# Patient Record
Sex: FEMALE | URBAN - METROPOLITAN AREA
[De-identification: names, ages, dates, MRNs, and addresses within clinical notes are randomized per-mention and may not be internally consistent; named-entity substitution may affect disease eponyms.]

---

## 2021-06-17 ENCOUNTER — APPOINTMENT (RX ONLY)
Dept: URBAN - METROPOLITAN AREA CLINIC 55 | Facility: CLINIC | Age: 71
Setting detail: DERMATOLOGY
End: 2021-06-17

## 2021-06-17 DIAGNOSIS — Z41.9 ENCOUNTER FOR PROCEDURE FOR PURPOSES OTHER THAN REMEDYING HEALTH STATE, UNSPECIFIED: ICD-10-CM

## 2021-06-17 PROCEDURE — ? DYSPORT

## 2021-06-17 PROCEDURE — ? FILLERS

## 2021-06-17 NOTE — PROCEDURE: FILLERS
[place] Filler Volume In Cc: 0
Expiration Date (Month Year): 12/2023
Anesthesia Type: 1% lidocaine with epinephrine
Include Cannula Size?: 27G
Include Cannula Information In Note?: No
Map Statment: See Attach Map for Complete Details
Filler: Restylane Kysse
Expiration Date (Month Year): 10/2022
Filler: Melia Miller
Lot #: [id number]
Detail Level: Simple
Lot #: [id number]
Include Cannula Information In Note?: Yes
Post-Care Instructions: After care instructions were provided to the patient.
Anesthesia Volume In Cc: 0.5
Consent: Written consent was obtained.

## 2021-06-17 NOTE — PROCEDURE: DYSPORT
Left Periorbital Skin Units: 0
Glabellar Complex Units: 65
Lot #: [id number]
Show Additional Area 6: Yes
Show Mentalis Units: No
Post-Care Instructions: After care instructions were provided to the patient.
Consent: Written consent was obtained.
Dilution (U/0.1 Cc): 10
Forehead Units: 25
Detail Level: Simple
Additional Area 1 Location: upper lip
Periorbital Skin Units: 50
Expiration Date (Month Year): 02/2022

## 2021-08-10 ENCOUNTER — APPOINTMENT (RX ONLY)
Dept: URBAN - METROPOLITAN AREA CLINIC 55 | Facility: CLINIC | Age: 71
Setting detail: DERMATOLOGY
End: 2021-08-10

## 2021-08-10 DIAGNOSIS — Z41.9 ENCOUNTER FOR PROCEDURE FOR PURPOSES OTHER THAN REMEDYING HEALTH STATE, UNSPECIFIED: ICD-10-CM

## 2021-08-10 PROCEDURE — ? FILLERS

## 2021-08-10 NOTE — PROCEDURE: FILLERS
Lot #: [id number]
[place] Filler Volume In Cc: 0
Include Cannula Information In Note?: Yes
Lot #: [id number]
Post-Care Instructions: After care instructions were provided to the patient.
Include Cannula Information In Note?: No
Filler: Restylane Defyne
Filler Comments: .5cc
Anesthesia Type: 1% lidocaine with epinephrine
Filler: Restylane-L
Anesthesia Volume In Cc: 0.5
Include Cannula Size?: 27G
Consent: Written consent was obtained.
Expiration Date (Month Year): 10/22
Map Statment: See Attach Map for Complete Details
Detail Level: Simple
Expiration Date (Month Year): 11/2023

## 2021-09-21 ENCOUNTER — APPOINTMENT (RX ONLY)
Dept: URBAN - METROPOLITAN AREA CLINIC 55 | Facility: CLINIC | Age: 71
Setting detail: DERMATOLOGY
End: 2021-09-21

## 2021-09-21 DIAGNOSIS — Z41.9 ENCOUNTER FOR PROCEDURE FOR PURPOSES OTHER THAN REMEDYING HEALTH STATE, UNSPECIFIED: ICD-10-CM

## 2021-09-21 PROCEDURE — ? DYSPORT

## 2021-09-21 NOTE — PROCEDURE: DYSPORT
Show Ucl Units: No
Additional Area 1 Location: upper lip
Show Periorbital Units: Yes
Mentalis Units: 0
Periorbital Skin Units: 50
Dilution (U/0.1 Cc): 10
Forehead Units: 25
Detail Level: Simple
Expiration Date (Month Year): 06/22
Lot #: [id number]
Post-Care Instructions: After care instructions were provided to the patient.
Glabellar Complex Units: 65
Consent: Written consent was obtained.

## 2021-12-22 ENCOUNTER — RX ONLY (OUTPATIENT)
Age: 71
Setting detail: RX ONLY
End: 2021-12-22

## 2021-12-22 RX ORDER — BIMATOPROST 0.3 MG/ML
SOLUTION/ DROPS OPHTHALMIC
Qty: 3 | Refills: 6 | COMMUNITY
Start: 2021-12-22

## 2022-07-15 ENCOUNTER — APPOINTMENT (RX ONLY)
Dept: URBAN - METROPOLITAN AREA CLINIC 55 | Facility: CLINIC | Age: 72
Setting detail: DERMATOLOGY
End: 2022-07-15

## 2022-07-21 ENCOUNTER — APPOINTMENT (RX ONLY)
Dept: URBAN - METROPOLITAN AREA CLINIC 55 | Facility: CLINIC | Age: 72
Setting detail: DERMATOLOGY
End: 2022-07-21

## 2022-07-21 DIAGNOSIS — Z41.9 ENCOUNTER FOR PROCEDURE FOR PURPOSES OTHER THAN REMEDYING HEALTH STATE, UNSPECIFIED: ICD-10-CM

## 2022-07-21 PROCEDURE — ? FILLERS

## 2022-07-21 PROCEDURE — ? DYSPORT

## 2022-07-21 NOTE — PROCEDURE: FILLERS
Additional Area 5 Volume In Cc: 0
Detail Level: Simple
Filler: Restylane Defyne
Anesthesia Volume In Cc: 0.5
Expiration Date (Month Year): 9/23
Include Cannula Information In Note?: Yes
Anesthesia Type: 1% lidocaine with epinephrine
Include Cannula Size?: 25G
Include Cannula Information In Note?: No
Filler: Restylane Contour
Consent: Written consent was obtained.
Filler: Restylane-L
Aspiration Statement: Aspiration was performed prior to injecting site with filler.
Post-Care Instructions: After care instructions were provided to the patient.
Include Cannula Size?: 27G
Expiration Date (Month Year): 2/23
Map Statment: See Attach Map for Complete Details
Lot #: [id number]
Expiration Date (Month Year): 9/24
Expiration Date (Month Year): 8/23
Lot #: [id number]
Lot #: [id number]
Lot #: [id number]

## 2022-07-21 NOTE — PROCEDURE: DYSPORT
Show Additional Area 5: Yes
Periorbital Skin Units: 50
Expiration Date (Month Year): 2/23
Post-Care Instructions: After care instructions were provided to the patient.
Lot #: [id number]
Lcl Root Units: 0
Detail Level: Simple
Consent: Written consent was obtained.
Show Right And Left Periorbital Units: No
Dilution (U/0.1 Cc): 10
Forehead Units: 25
Glabellar Complex Units: 65
Additional Area 1 Location: upper lip

## 2022-10-19 ENCOUNTER — APPOINTMENT (RX ONLY)
Dept: URBAN - METROPOLITAN AREA CLINIC 55 | Facility: CLINIC | Age: 72
Setting detail: DERMATOLOGY
End: 2022-10-19

## 2022-10-19 DIAGNOSIS — Z41.9 ENCOUNTER FOR PROCEDURE FOR PURPOSES OTHER THAN REMEDYING HEALTH STATE, UNSPECIFIED: ICD-10-CM

## 2022-10-19 PROCEDURE — ? DYSPORT

## 2022-10-19 PROCEDURE — ? FILLERS

## 2022-10-19 PROCEDURE — ? INVENTORY

## 2022-10-19 NOTE — PROCEDURE: FILLERS
Additional Anesthesia Volume In Cc: 0
Include Cannula Size?: 27G
Detail Level: Detailed
Include Cannula Information In Note?: No
Consent was obtained.
Post-Care Instructions: After care instructions were provided verbally and in writing.
Filler: Restylane Defyne
Map Statment: See Attach Map for Complete Details
Include Cannula Information In Note?: Yes
Anesthesia Type: 1% lidocaine with epinephrine
Anesthesia Volume In Cc: 0.5

## 2022-10-19 NOTE — PROCEDURE: DYSPORT
R Brow Units: 0
Glabellar Complex Units: 65
Show Glabellar Units: Yes
Detail Level: Detailed
Show Right And Left Pupillary Line Units: No
Show Inventory Tab: Show
Periorbital Skin Units: 50
Dilution (U/0.1 Cc): 10
Consent was obtained.
Post-Care Instructions: Patient instructed to not lie down for 4 hours and limit physical activity for 24 hours.
Forehead Units: 25

## 2023-01-18 ENCOUNTER — APPOINTMENT (RX ONLY)
Dept: URBAN - METROPOLITAN AREA CLINIC 55 | Facility: CLINIC | Age: 73
Setting detail: DERMATOLOGY
End: 2023-01-18

## 2023-01-18 DIAGNOSIS — Z41.9 ENCOUNTER FOR PROCEDURE FOR PURPOSES OTHER THAN REMEDYING HEALTH STATE, UNSPECIFIED: ICD-10-CM

## 2023-01-18 PROCEDURE — ? INVENTORY

## 2023-01-18 PROCEDURE — ? ACNE SURGERY

## 2023-01-18 PROCEDURE — ? DYSPORT

## 2023-01-18 PROCEDURE — 10040 EXTRACTION: CPT

## 2023-01-18 ASSESSMENT — LOCATION DETAILED DESCRIPTION DERM
LOCATION DETAILED: LEFT MEDIAL MALAR CHEEK
LOCATION DETAILED: LEFT INFERIOR LATERAL FOREHEAD
LOCATION DETAILED: LEFT INFERIOR FOREHEAD

## 2023-01-18 ASSESSMENT — LOCATION SIMPLE DESCRIPTION DERM
LOCATION SIMPLE: LEFT FOREHEAD
LOCATION SIMPLE: LEFT CHEEK

## 2023-01-18 ASSESSMENT — LOCATION ZONE DERM: LOCATION ZONE: FACE

## 2023-01-18 NOTE — PROCEDURE: DYSPORT
Lateral Platysmal Bands Units: 0
Show Ucl Units: No
Show Additional Area 6: Yes
Forehead Units: 25
Glabellar Complex Units: 65
Detail Level: Detailed
Consent was obtained.
Show Inventory Tab: Show
Post-Care Instructions: Patient instructed to not lie down for 4 hours and limit physical activity for 24 hours.
Periorbital Skin Units: 50
Dilution (U/0.1 Cc): 10

## 2023-01-18 NOTE — PROCEDURE: ACNE SURGERY
Extraction Method: 11 blade and comedo extractor
Detail Level: Detailed
Render Number Of Lesions Treated: no
Acne Type: Milial cysts
Prep Text (Optional): Prior to removal the treatment areas were prepped in the usual fashion.
Consent was obtained and risks were reviewed including but not limited to scarring, infection, bleeding, scabbing, incomplete removal, and allergy to anesthesia.
Post-Care Instructions: I reviewed with the patient in detail post-care instructions. Patient is to keep the treatment areas dry overnight, and then apply bacitracin twice daily until healed. Patient may apply hydrogen peroxide soaks to remove any crusting.

## 2023-12-12 ENCOUNTER — APPOINTMENT (RX ONLY)
Dept: URBAN - METROPOLITAN AREA CLINIC 55 | Facility: CLINIC | Age: 73
Setting detail: DERMATOLOGY
End: 2023-12-12

## 2023-12-12 DIAGNOSIS — Z41.9 ENCOUNTER FOR PROCEDURE FOR PURPOSES OTHER THAN REMEDYING HEALTH STATE, UNSPECIFIED: ICD-10-CM

## 2023-12-12 PROCEDURE — ? DYSPORT

## 2023-12-12 PROCEDURE — ? FILLERS

## 2023-12-12 NOTE — PROCEDURE: DYSPORT
Left Pupillary Line Units: 0
Show Orbicularis Oculi Units: Yes
Periorbital Skin Units: 50
Show Mentalis Units: No
Dilution (U/0.1 Cc): 10
Post-Care Instructions: Patient instructed to not lie down for 4 hours and limit physical activity for 24 hours.
Consent was obtained.
Forehead Units: 25
Glabellar Complex Units: 65
Detail Level: Detailed
Show Inventory Tab: Show

## 2023-12-12 NOTE — PROCEDURE: FILLERS
Decollete Filler Volume In Cc: 0
Include Cannula Information In Note?: No
Anesthesia Type: 1% lidocaine with epinephrine
Anesthesia Volume In Cc: 0.5
Detail Level: Detailed
Include Cannula Size?: 27G
Filler: RHA 3
Map Statment: See Attach Map for Complete Details
Include Cannula Information In Note?: Yes
Consent was obtained.
Post-Care Instructions: After care instructions were provided verbally and in writing.

## 2023-12-14 ENCOUNTER — APPOINTMENT (RX ONLY)
Dept: URBAN - METROPOLITAN AREA CLINIC 55 | Facility: CLINIC | Age: 73
Setting detail: DERMATOLOGY
End: 2023-12-14

## 2023-12-14 DIAGNOSIS — Z41.9 ENCOUNTER FOR PROCEDURE FOR PURPOSES OTHER THAN REMEDYING HEALTH STATE, UNSPECIFIED: ICD-10-CM

## 2023-12-14 PROCEDURE — ? INVENTORY

## 2023-12-14 PROCEDURE — ? DYSPORT

## 2023-12-14 PROCEDURE — ? FILLERS

## 2023-12-14 NOTE — PROCEDURE: DYSPORT
Additional Area 1 Units: 0
Show Periorbital Units: Yes
Glabellar Complex Units: 65
Detail Level: Detailed
Consent was obtained.
Show Right And Left Pupillary Line Units: No
Show Inventory Tab: Show
Post-Care Instructions: Patient instructed to not lie down for 4 hours and limit physical activity for 24 hours.
Dilution (U/0.1 Cc): 10
Periorbital Skin Units: 50
Forehead Units: 25

## 2023-12-14 NOTE — PROCEDURE: FILLERS
Additional Area 3 Volume In Cc: 0
Include Cannula Size?: 27G
Post-Care Instructions: After care instructions were provided verbally and in writing.
Include Cannula Information In Note?: Yes
Consent was obtained.
Detail Level: Detailed
Filler: RHA Redensity
Include Cannula Information In Note?: No
Map Statment: See Attach Map for Complete Details
Filler: RHA 3
Anesthesia Type: 1% lidocaine with epinephrine
Anesthesia Volume In Cc: 0.5

## 2023-12-15 ENCOUNTER — APPOINTMENT (RX ONLY)
Dept: URBAN - METROPOLITAN AREA CLINIC 55 | Facility: CLINIC | Age: 73
Setting detail: DERMATOLOGY
End: 2023-12-15

## 2023-12-15 DIAGNOSIS — Z41.9 ENCOUNTER FOR PROCEDURE FOR PURPOSES OTHER THAN REMEDYING HEALTH STATE, UNSPECIFIED: ICD-10-CM

## 2023-12-15 PROCEDURE — ? DERMAPLANE

## 2023-12-15 PROCEDURE — ? INVENTORY

## 2023-12-15 ASSESSMENT — LOCATION SIMPLE DESCRIPTION DERM: LOCATION SIMPLE: RIGHT FOREHEAD

## 2023-12-15 ASSESSMENT — LOCATION DETAILED DESCRIPTION DERM: LOCATION DETAILED: RIGHT INFERIOR MEDIAL FOREHEAD

## 2023-12-15 ASSESSMENT — LOCATION ZONE DERM: LOCATION ZONE: FACE

## 2023-12-15 NOTE — PROCEDURE: DERMAPLANE
Detail Level: Zone
Blade: [place] scalpel
Post-Procedure Instructions: Following the dermaplane procedure. Moisturizer and SPF was applied.
Treatment Area Prep: acetone
Treatment Areas: face
Post-Care Instructions: I reviewed with the patient in detail post-care instructions.
Pre-Procedure Text: The patient was placed in a recumbant position on the procedure table.

## 2024-05-23 ENCOUNTER — APPOINTMENT (RX ONLY)
Dept: URBAN - METROPOLITAN AREA CLINIC 55 | Facility: CLINIC | Age: 74
Setting detail: DERMATOLOGY
End: 2024-05-23

## 2024-05-23 DIAGNOSIS — Z41.9 ENCOUNTER FOR PROCEDURE FOR PURPOSES OTHER THAN REMEDYING HEALTH STATE, UNSPECIFIED: ICD-10-CM

## 2024-05-23 PROCEDURE — ? INVENTORY

## 2024-05-23 PROCEDURE — ? FILLERS

## 2024-05-23 PROCEDURE — ? DYSPORT

## 2024-05-23 NOTE — PROCEDURE: FILLERS
Additional Area 5 Volume In Cc: 0
Detail Level: Detailed
Include Cannula Information In Note?: No
Include Cannula Size?: 27G
Filler: Restylane Kysse
Include Cannula Size?: 25G
Filler: Restylane Defyne
Map Statment: See Attach Map for Complete Details
Include Cannula Information In Note?: Yes
Anesthesia Type: 1% lidocaine with epinephrine
Anesthesia Volume In Cc: 0.5
Post-Care Instructions: After care instructions were provided verbally and in writing.
Consent was obtained.

## 2024-05-23 NOTE — PROCEDURE: DYSPORT
Show Additional Area 6: Yes
Show Ucl Units: No
Forehead Units: 25
Levator Labii Superioris Units: 0
Consent was obtained.
Detail Level: Detailed
Glabellar Complex Units: 65
Post-Care Instructions: Patient instructed to not lie down for 4 hours and limit physical activity for 24 hours.
Dilution (U/0.1 Cc): 10
Show Inventory Tab: Show
Additional Area 1 Location: Chin
Periorbital Skin Units: 50
no radiation

## 2024-08-23 ENCOUNTER — APPOINTMENT (RX ONLY)
Dept: URBAN - METROPOLITAN AREA CLINIC 55 | Facility: CLINIC | Age: 74
Setting detail: DERMATOLOGY
End: 2024-08-23

## 2024-08-23 DIAGNOSIS — Z41.9 ENCOUNTER FOR PROCEDURE FOR PURPOSES OTHER THAN REMEDYING HEALTH STATE, UNSPECIFIED: ICD-10-CM

## 2024-08-23 PROCEDURE — ? DYSPORT

## 2024-08-23 PROCEDURE — ? FILLERS

## 2024-08-23 PROCEDURE — ? INVENTORY

## 2024-08-23 NOTE — PROCEDURE: DYSPORT
Show Right And Left Periorbital Units: No
Additional Area 2 Units: 0
Show Additional Area 5: Yes
Post-Care Instructions: Patient instructed to not lie down for 4 hours and limit physical activity for 24 hours.
Forehead Units: 25
Dilution (U/0.1 Cc): 10
Glabellar Complex Units: 65
Detail Level: Detailed
Show Inventory Tab: Show
Additional Area 1 Location: Chin
Periorbital Skin Units: 50
Consent was obtained.

## 2024-08-23 NOTE — PROCEDURE: FILLERS
Decollete Filler Volume In Cc: 0
Use Map Statement For Sites (Optional): No
Map Statment: See Attach Map for Complete Details
Include Cannula Information In Note?: Yes
Anesthesia Type: 1% lidocaine with epinephrine
Anesthesia Volume In Cc: 0.5
Include Cannula Size?: 27G
Include Cannula Size?: 25G
Consent was obtained.
Post-Care Instructions: After care instructions were provided verbally and in writing.
Filler: Restylane Defyne
Detail Level: Detailed

## 2024-08-27 ENCOUNTER — APPOINTMENT (RX ONLY)
Dept: URBAN - METROPOLITAN AREA CLINIC 55 | Facility: CLINIC | Age: 74
Setting detail: DERMATOLOGY
End: 2024-08-27

## 2024-08-27 DIAGNOSIS — Z41.9 ENCOUNTER FOR PROCEDURE FOR PURPOSES OTHER THAN REMEDYING HEALTH STATE, UNSPECIFIED: ICD-10-CM

## 2024-08-27 PROCEDURE — ? DERMAPLANE

## 2024-08-27 PROCEDURE — ? INVENTORY

## 2024-08-27 ASSESSMENT — LOCATION ZONE DERM: LOCATION ZONE: FACE

## 2024-08-27 ASSESSMENT — LOCATION DETAILED DESCRIPTION DERM: LOCATION DETAILED: INFERIOR MID FOREHEAD

## 2024-08-27 ASSESSMENT — LOCATION SIMPLE DESCRIPTION DERM: LOCATION SIMPLE: INFERIOR FOREHEAD

## 2024-08-27 NOTE — PROCEDURE: DERMAPLANE
Post-Procedure Instructions: Following the dermaplane procedure. Moisturizer and SPF was applied.
Detail Level: Zone
Treatment Areas: face
Post-Care Instructions: I reviewed with the patient in detail post-care instructions.
Blade: [place] scalpel
Pre-Procedure Text: The patient was placed in a recumbant position on the procedure table.
Treatment Area Prep: acetone

## 2024-11-12 ENCOUNTER — APPOINTMENT (RX ONLY)
Dept: URBAN - METROPOLITAN AREA CLINIC 55 | Facility: CLINIC | Age: 74
Setting detail: DERMATOLOGY
End: 2024-11-12

## 2024-11-12 DIAGNOSIS — Z41.9 ENCOUNTER FOR PROCEDURE FOR PURPOSES OTHER THAN REMEDYING HEALTH STATE, UNSPECIFIED: ICD-10-CM

## 2024-11-12 PROCEDURE — ? DERMAPLANE

## 2024-11-12 PROCEDURE — ? INVENTORY

## 2024-11-12 ASSESSMENT — LOCATION DETAILED DESCRIPTION DERM: LOCATION DETAILED: LEFT INFERIOR MEDIAL FOREHEAD

## 2024-11-12 ASSESSMENT — LOCATION SIMPLE DESCRIPTION DERM: LOCATION SIMPLE: LEFT FOREHEAD

## 2024-11-12 ASSESSMENT — LOCATION ZONE DERM: LOCATION ZONE: FACE

## 2024-11-12 NOTE — PROCEDURE: DERMAPLANE
Treatment Areas: face
Blade: [place] scalpel
Pre-Procedure Text: The patient was placed in a recumbant position on the procedure table.
Post-Care Instructions: I reviewed with the patient in detail post-care instructions.
Post-Procedure Instructions: Following the dermaplane procedure. Moisturizer and SPF was applied.
Treatment Area Prep: acetone
Detail Level: Zone

## 2024-11-14 ENCOUNTER — APPOINTMENT (RX ONLY)
Dept: URBAN - METROPOLITAN AREA CLINIC 55 | Facility: CLINIC | Age: 74
Setting detail: DERMATOLOGY
End: 2024-11-14

## 2024-11-14 DIAGNOSIS — Z41.9 ENCOUNTER FOR PROCEDURE FOR PURPOSES OTHER THAN REMEDYING HEALTH STATE, UNSPECIFIED: ICD-10-CM

## 2024-11-14 PROCEDURE — ? INVENTORY

## 2024-11-14 PROCEDURE — ? FILLERS

## 2024-11-14 PROCEDURE — ? DYSPORT

## 2024-11-14 PROCEDURE — ? SCULPTRA

## 2024-11-14 NOTE — PROCEDURE: FILLERS
Mid Face Filler Volume In Cc: 0
Include Cannula Information In Note?: Yes
Anesthesia Volume In Cc: 0.5
Include Cannula Information In Note?: No
Detail Level: Detailed
Include Cannula Size?: 25G
Include Cannula Size?: 27G
Consent was obtained.
Filler: RHA Redensity
Post-Care Instructions: After care instructions were provided verbally and in writing.
Filler: Restylane Defyne
Map Statment: See Attach Map for Complete Details
Anesthesia Type: 1% lidocaine with epinephrine

## 2024-11-14 NOTE — PROCEDURE: SCULPTRA
Right [facility] Filler Volume In Cc: 0
Show Tear Troughs Volume?: Yes
Post-Care Instructions: After care instructions were provided to the patient.
Anesthesia Volume In Cc: 0.5
Use Map Statement For Sites (Optional): No
Dilution Method: The Sculptra was reconstituted with 8 ml of sterile water and 2cc 2% plain lidocaine for each vial.
Treatment Number: 1
Detail Level: Detailed
Injection Technique: The Sculptra was injected to the areas shown in black with a 25g cannula after prepping the skin with alcohol and /or chlorhexidine and providing appropriate anesthesia.
Consent: Written consent obtained.
Show Inventory Tab: Show
Anesthesia Type: 1% lidocaine with epinephrine

## 2024-11-14 NOTE — PROCEDURE: DYSPORT
Show Anterior Platysmal Band Units: Yes
Left Pupillary Line Units: 0
Show Ucl Units: No
Periorbital Skin Units: 50
Post-Care Instructions: Patient instructed to not lie down for 4 hours and limit physical activity for 24 hours.
Dilution (U/0.1 Cc): 10
Additional Area 1 Location: Chin
Detail Level: Detailed
Glabellar Complex Units: 65
Forehead Units: 25
Consent was obtained.
Show Inventory Tab: Show

## 2025-06-18 ENCOUNTER — APPOINTMENT (OUTPATIENT)
Dept: URBAN - METROPOLITAN AREA CLINIC 55 | Facility: CLINIC | Age: 75
Setting detail: DERMATOLOGY
End: 2025-06-18

## 2025-06-18 DIAGNOSIS — Z41.9 ENCOUNTER FOR PROCEDURE FOR PURPOSES OTHER THAN REMEDYING HEALTH STATE, UNSPECIFIED: ICD-10-CM

## 2025-06-18 PROCEDURE — ? DYSPORT

## 2025-06-18 NOTE — PROCEDURE: DYSPORT
[facility] Units: 0
Show Glabellar Units: Yes
Show Mentalis Units: No
Periorbital Skin Units: 50
Additional Area 1 Location: Chin
Consent was obtained.
Detail Level: Detailed
Forehead Units: 25
Post-Care Instructions: Patient instructed to not lie down for 4 hours and limit physical activity for 24 hours.
Glabellar Complex Units: 65
Dilution (U/0.1 Cc): 10
Show Inventory Tab: Show

## 2025-06-24 ENCOUNTER — APPOINTMENT (OUTPATIENT)
Dept: URBAN - METROPOLITAN AREA CLINIC 55 | Facility: CLINIC | Age: 75
Setting detail: DERMATOLOGY
End: 2025-06-24

## 2025-06-24 DIAGNOSIS — Z41.9 ENCOUNTER FOR PROCEDURE FOR PURPOSES OTHER THAN REMEDYING HEALTH STATE, UNSPECIFIED: ICD-10-CM

## 2025-06-24 PROCEDURE — ? DYSPORT

## 2025-06-24 PROCEDURE — ? INVENTORY

## 2025-06-24 PROCEDURE — ? FILLERS

## 2025-06-24 NOTE — PROCEDURE: FILLERS
Post-Care Instructions: After care instructions were provided verbally and in writing.
Nasolabial Folds Filler Volume In Cc: 0
Include Cannula Information In Note?: No
Include Cannula Information In Note?: Yes
Anesthesia Type: 1% lidocaine with epinephrine
Include Cannula Size?: 27G
Anesthesia Volume In Cc: 0.5
Include Cannula Size?: 25G
Detail Level: Detailed
Filler: Restylane Defyne
Filler: RHA Redensity
Map Statment: See Attach Map for Complete Details
Consent was obtained.

## 2025-06-24 NOTE — PROCEDURE: DYSPORT
Additional Area 3 Units: 0
Show Depressor Anguli Units: Yes
Show Right And Left Brow Units: No
Dilution (U/0.1 Cc): 10
Forehead Units: 25
Consent was obtained.
Glabellar Complex Units: 65
Detail Level: Detailed
Post-Care Instructions: Patient instructed to not lie down for 4 hours and limit physical activity for 24 hours.
Additional Area 1 Location: Chin
Show Inventory Tab: Show
Periorbital Skin Units: 50